# Patient Record
Sex: FEMALE | Race: WHITE | Employment: OTHER | URBAN - METROPOLITAN AREA
[De-identification: names, ages, dates, MRNs, and addresses within clinical notes are randomized per-mention and may not be internally consistent; named-entity substitution may affect disease eponyms.]

---

## 2017-08-12 ENCOUNTER — HOSPITAL ENCOUNTER (EMERGENCY)
Age: 62
Discharge: HOME | End: 2017-08-13
Attending: EMERGENCY MEDICINE
Payer: COMMERCIAL

## 2017-08-12 DIAGNOSIS — F41.8 ANXIETY ASSOCIATED WITH DEPRESSION: Primary | ICD-10-CM

## 2017-08-12 DIAGNOSIS — R68.89 WITHDRAWAL COMPLAINT: ICD-10-CM

## 2017-08-12 PROCEDURE — 96361 HYDRATE IV INFUSION ADD-ON: CPT

## 2017-08-12 PROCEDURE — 96372 THER/PROPH/DIAG INJ SC/IM: CPT

## 2017-08-12 PROCEDURE — 99283 EMERGENCY DEPT VISIT LOW MDM: CPT

## 2017-08-12 PROCEDURE — 96374 THER/PROPH/DIAG INJ IV PUSH: CPT

## 2017-08-13 VITALS
HEIGHT: 63 IN | HEART RATE: 87 BPM | SYSTOLIC BLOOD PRESSURE: 132 MMHG | BODY MASS INDEX: 26.64 KG/M2 | RESPIRATION RATE: 16 BRPM | OXYGEN SATURATION: 96 % | TEMPERATURE: 97.6 F | DIASTOLIC BLOOD PRESSURE: 84 MMHG | WEIGHT: 150.35 LBS

## 2017-08-13 LAB
ALBUMIN SERPL BCP-MCNC: 4.1 G/DL (ref 3.5–5)
ALBUMIN/GLOB SERPL: 1.3 {RATIO} (ref 1.1–2.2)
ALP SERPL-CCNC: 86 U/L (ref 45–117)
ALT SERPL-CCNC: 47 U/L (ref 12–78)
ANION GAP BLD CALC-SCNC: 12 MMOL/L (ref 5–15)
AST SERPL W P-5'-P-CCNC: 41 U/L (ref 15–37)
BASOPHILS # BLD AUTO: 0 K/UL (ref 0–0.1)
BASOPHILS # BLD: 0 % (ref 0–1)
BILIRUB SERPL-MCNC: 1.3 MG/DL (ref 0.2–1)
BUN SERPL-MCNC: 14 MG/DL (ref 6–20)
BUN/CREAT SERPL: 14 (ref 12–20)
CALCIUM SERPL-MCNC: 9.7 MG/DL (ref 8.5–10.1)
CHLORIDE SERPL-SCNC: 104 MMOL/L (ref 97–108)
CK SERPL-CCNC: 299 U/L (ref 26–192)
CO2 SERPL-SCNC: 21 MMOL/L (ref 21–32)
CREAT SERPL-MCNC: 0.97 MG/DL (ref 0.55–1.02)
EOSINOPHIL # BLD: 0.1 K/UL (ref 0–0.4)
EOSINOPHIL NFR BLD: 2 % (ref 0–7)
ERYTHROCYTE [DISTWIDTH] IN BLOOD BY AUTOMATED COUNT: 12.3 % (ref 11.5–14.5)
GLOBULIN SER CALC-MCNC: 3.2 G/DL (ref 2–4)
GLUCOSE SERPL-MCNC: 114 MG/DL (ref 65–100)
HCT VFR BLD AUTO: 34.6 % (ref 35–47)
HGB BLD-MCNC: 12.2 G/DL (ref 11.5–16)
LYMPHOCYTES # BLD AUTO: 42 % (ref 12–49)
LYMPHOCYTES # BLD: 2.3 K/UL (ref 0.8–3.5)
MAGNESIUM SERPL-MCNC: 2.1 MG/DL (ref 1.6–2.4)
MCH RBC QN AUTO: 32 PG (ref 26–34)
MCHC RBC AUTO-ENTMCNC: 35.3 G/DL (ref 30–36.5)
MCV RBC AUTO: 90.8 FL (ref 80–99)
MONOCYTES # BLD: 0.3 K/UL (ref 0–1)
MONOCYTES NFR BLD AUTO: 6 % (ref 5–13)
NEUTS SEG # BLD: 2.7 K/UL (ref 1.8–8)
NEUTS SEG NFR BLD AUTO: 50 % (ref 32–75)
PLATELET # BLD AUTO: 246 K/UL (ref 150–400)
POTASSIUM SERPL-SCNC: 3.3 MMOL/L (ref 3.5–5.1)
PROT SERPL-MCNC: 7.3 G/DL (ref 6.4–8.2)
RBC # BLD AUTO: 3.81 M/UL (ref 3.8–5.2)
SODIUM SERPL-SCNC: 137 MMOL/L (ref 136–145)
TROPONIN I SERPL-MCNC: <0.04 NG/ML
WBC # BLD AUTO: 5.5 K/UL (ref 3.6–11)

## 2017-08-13 PROCEDURE — 80053 COMPREHEN METABOLIC PANEL: CPT | Performed by: EMERGENCY MEDICINE

## 2017-08-13 PROCEDURE — 84484 ASSAY OF TROPONIN QUANT: CPT | Performed by: EMERGENCY MEDICINE

## 2017-08-13 PROCEDURE — 74011250636 HC RX REV CODE- 250/636: Performed by: EMERGENCY MEDICINE

## 2017-08-13 PROCEDURE — 74011250637 HC RX REV CODE- 250/637

## 2017-08-13 PROCEDURE — 82550 ASSAY OF CK (CPK): CPT | Performed by: EMERGENCY MEDICINE

## 2017-08-13 PROCEDURE — 36415 COLL VENOUS BLD VENIPUNCTURE: CPT | Performed by: EMERGENCY MEDICINE

## 2017-08-13 PROCEDURE — 74011250637 HC RX REV CODE- 250/637: Performed by: EMERGENCY MEDICINE

## 2017-08-13 PROCEDURE — 85025 COMPLETE CBC W/AUTO DIFF WBC: CPT | Performed by: EMERGENCY MEDICINE

## 2017-08-13 PROCEDURE — 83735 ASSAY OF MAGNESIUM: CPT | Performed by: EMERGENCY MEDICINE

## 2017-08-13 RX ORDER — CLONAZEPAM 0.5 MG/1
1 TABLET ORAL
Status: COMPLETED | OUTPATIENT
Start: 2017-08-13 | End: 2017-08-13

## 2017-08-13 RX ORDER — CLONAZEPAM 1 MG/1
1 TABLET ORAL
Qty: 20 TAB | Refills: 0 | Status: SHIPPED | OUTPATIENT
Start: 2017-08-13

## 2017-08-13 RX ORDER — LORAZEPAM 2 MG/ML
1 INJECTION INTRAMUSCULAR
Status: COMPLETED | OUTPATIENT
Start: 2017-08-13 | End: 2017-08-13

## 2017-08-13 RX ORDER — ZOLPIDEM TARTRATE 5 MG/1
5 TABLET ORAL
Status: COMPLETED | OUTPATIENT
Start: 2017-08-13 | End: 2017-08-13

## 2017-08-13 RX ORDER — PAROXETINE HYDROCHLORIDE 20 MG/1
20 TABLET, FILM COATED ORAL
Status: COMPLETED | OUTPATIENT
Start: 2017-08-13 | End: 2017-08-13

## 2017-08-13 RX ORDER — CLONAZEPAM 0.5 MG/1
TABLET ORAL
Status: DISCONTINUED
Start: 2017-08-13 | End: 2017-08-13 | Stop reason: HOSPADM

## 2017-08-13 RX ORDER — LORAZEPAM 2 MG/ML
2 INJECTION INTRAMUSCULAR
Status: COMPLETED | OUTPATIENT
Start: 2017-08-13 | End: 2017-08-13

## 2017-08-13 RX ORDER — ZOLPIDEM TARTRATE 5 MG/1
10 TABLET ORAL
Qty: 15 TAB | Refills: 0 | Status: SHIPPED | OUTPATIENT
Start: 2017-08-13

## 2017-08-13 RX ORDER — PAROXETINE HYDROCHLORIDE 40 MG/1
40 TABLET, FILM COATED ORAL DAILY
Qty: 5 TAB | Refills: 0 | Status: SHIPPED | OUTPATIENT
Start: 2017-08-13

## 2017-08-13 RX ORDER — ZOLPIDEM TARTRATE 5 MG/1
TABLET ORAL
Status: COMPLETED
Start: 2017-08-13 | End: 2017-08-13

## 2017-08-13 RX ADMIN — LORAZEPAM 2 MG: 2 INJECTION INTRAMUSCULAR; INTRAVENOUS at 01:09

## 2017-08-13 RX ADMIN — LORAZEPAM 1 MG: 2 INJECTION INTRAMUSCULAR; INTRAVENOUS at 00:12

## 2017-08-13 RX ADMIN — ZOLPIDEM TARTRATE 5 MG: 5 TABLET ORAL at 01:46

## 2017-08-13 RX ADMIN — CLONAZEPAM 1 MG: 0.5 TABLET ORAL at 01:46

## 2017-08-13 RX ADMIN — PAROXETINE 20 MG: 20 TABLET, FILM COATED ORAL at 02:44

## 2017-08-13 RX ADMIN — SODIUM CHLORIDE 1000 ML: 900 INJECTION, SOLUTION INTRAVENOUS at 01:46

## 2017-08-13 NOTE — DISCHARGE INSTRUCTIONS

## 2017-08-13 NOTE — ED NOTES
Pt sitting up in bed, talking, and appears calm. Pt states \"I feel like I can breathe again\" and states she thinks she can go home at this time. Waiting on medication to be sent from rx at this time. Informed pt of this.

## 2017-08-13 NOTE — ED NOTES
Pt complaining that ativan did not relieve symptoms. Pt states \"I'm having convulsions\". Upon assessment, pt is not having convulsions, but is very restless and unable to keep still.  Notified MD.

## 2017-08-13 NOTE — ED NOTES
Entered room to place IV and medicate pt. Pt calmer and resting quietly, but raising/tensing all limbs occasionally, stating \"I'm having convulsions\".

## 2017-08-13 NOTE — ED PROVIDER NOTES
HPI Comments: Channing Garland is a 60 y.o. female with PMhx significant for anxiety, depression, and plantar fasciitis, who presents ambulatory with sister and brother-in-law to the [facility] ED with cc of anxiety attack starting shortly PTA, which was triggered by events in a theater play she was viewing with her sister. Pt believes symptoms are also due to medication withdrawal, which she felt gradually coming on for a few days. Pt reports she has been in town since 8/10/17 visiting her sister from [place], but she forgot her anxiety and depression medications at home in [place]; medications include clonazepam 0.25mg, tranquilizer lorazepam 0.25mg, paxil, and ambien. She states she has not taken her medication x 5 days (since 8/8/17), and that she normally feels stable when regularly taking her medication. Pt denies CP, SOB, tingling, and numbness. Social Hx: - Tobacco, - EtOH    PCP: PROVIDER UNKNOWN    There are no other complaints, changes or physical findings at this time. The history is provided by the patient. No  was used. No past medical history on file. No past surgical history on file. No family history on file. Social History     Social History    Marital status:      Spouse name: N/A    Number of children: N/A    Years of education: N/A     Occupational History    Not on file. Social History Main Topics    Smoking status: Not on file    Smokeless tobacco: Not on file    Alcohol use Not on file    Drug use: Not on file    Sexual activity: Not on file     Other Topics Concern    Not on file     Social History Narrative         ALLERGIES: Claritin [loratadine]    Review of Systems   Constitutional: Negative for fever. HENT: Negative for congestion. Eyes: Negative. Respiratory: Negative for shortness of breath. Cardiovascular: Negative for chest pain. Gastrointestinal: Negative for abdominal pain.    Endocrine: Negative for heat intolerance. Genitourinary: Negative. Musculoskeletal: Negative for back pain. Skin: Negative for rash. Allergic/Immunologic: Negative for immunocompromised state. Neurological: Negative for numbness. Hematological: Does not bruise/bleed easily. Psychiatric/Behavioral: The patient is nervous/anxious.         + Medication withdrawal   All other systems reviewed and are negative. Patient Vitals for the past 12 hrs:   Temp Pulse Resp BP SpO2   08/13/17 0248 - - - - 96 %   08/13/17 0247 97.6 °F (36.4 °C) 87 16 132/84 96 %   08/13/17 0002 - (!) 112 24 (!) 180/107 95 %       Physical Exam   Constitutional: She is oriented to person, place, and time. She appears well-developed and well-nourished. She appears distressed (moderate). Tremulous   HENT:   Head: Normocephalic and atraumatic. Eyes: EOM are normal.   Neck: Normal range of motion. Neck supple. Cardiovascular: Regular rhythm and normal heart sounds. Tachycardia present. Pulmonary/Chest: Effort normal and breath sounds normal. No respiratory distress. Abdominal: Soft. Bowel sounds are normal. She exhibits no mass. There is no tenderness. Musculoskeletal: Normal range of motion. She exhibits no edema. Neurological: She is alert and oriented to person, place, and time. Coordination normal.   Skin: Skin is warm and dry. Psychiatric: She has a normal mood and affect. Nursing note and vitals reviewed. MDM  Number of Diagnoses or Management Options  Anxiety associated with depression:   Withdrawal complaint:   Diagnosis management comments:   DDx: Anxiety, withdrawal       Amount and/or Complexity of Data Reviewed  Clinical lab tests: ordered and reviewed  Review and summarize past medical records: yes  Independent visualization of images, tracings, or specimens: yes    Patient Progress  Patient progress: stable    ED Course       Procedures    PROGRESS NOTE:  12:47 AM  Pt does not feel better with the Ativan.     PROGRESS NOTE:  2:40 AM  Pt is feeling better, just waiting for a couple of labs to come back. LABORATORY TESTS:  Recent Results (from the past 12 hour(s))   CBC WITH AUTOMATED DIFF    Collection Time: 08/13/17  1:12 AM   Result Value Ref Range    WBC 5.5 3.6 - 11.0 K/uL    RBC 3.81 3.80 - 5.20 M/uL    HGB 12.2 11.5 - 16.0 g/dL    HCT 34.6 (L) 35.0 - 47.0 %    MCV 90.8 80.0 - 99.0 FL    MCH 32.0 26.0 - 34.0 PG    MCHC 35.3 30.0 - 36.5 g/dL    RDW 12.3 11.5 - 14.5 %    PLATELET 181 553 - 111 K/uL    NEUTROPHILS 50 32 - 75 %    LYMPHOCYTES 42 12 - 49 %    MONOCYTES 6 5 - 13 %    EOSINOPHILS 2 0 - 7 %    BASOPHILS 0 0 - 1 %    ABS. NEUTROPHILS 2.7 1.8 - 8.0 K/UL    ABS. LYMPHOCYTES 2.3 0.8 - 3.5 K/UL    ABS. MONOCYTES 0.3 0.0 - 1.0 K/UL    ABS. EOSINOPHILS 0.1 0.0 - 0.4 K/UL    ABS. BASOPHILS 0.0 0.0 - 0.1 K/UL   METABOLIC PANEL, COMPREHENSIVE    Collection Time: 08/13/17  1:12 AM   Result Value Ref Range    Sodium 137 136 - 145 mmol/L    Potassium 3.3 (L) 3.5 - 5.1 mmol/L    Chloride 104 97 - 108 mmol/L    CO2 21 21 - 32 mmol/L    Anion gap 12 5 - 15 mmol/L    Glucose 114 (H) 65 - 100 mg/dL    BUN 14 6 - 20 MG/DL    Creatinine 0.97 0.55 - 1.02 MG/DL    BUN/Creatinine ratio 14 12 - 20      GFR est AA >60 >60 ml/min/1.73m2    GFR est non-AA 58 (L) >60 ml/min/1.73m2    Calcium 9.7 8.5 - 10.1 MG/DL    Bilirubin, total 1.3 (H) 0.2 - 1.0 MG/DL    ALT (SGPT) 47 12 - 78 U/L    AST (SGOT) 41 (H) 15 - 37 U/L    Alk.  phosphatase 86 45 - 117 U/L    Protein, total 7.3 6.4 - 8.2 g/dL    Albumin 4.1 3.5 - 5.0 g/dL    Globulin 3.2 2.0 - 4.0 g/dL    A-G Ratio 1.3 1.1 - 2.2     MAGNESIUM    Collection Time: 08/13/17  1:12 AM   Result Value Ref Range    Magnesium 2.1 1.6 - 2.4 mg/dL   CK    Collection Time: 08/13/17  1:12 AM   Result Value Ref Range     (H) 26 - 192 U/L   TROPONIN I    Collection Time: 08/13/17  1:12 AM   Result Value Ref Range    Troponin-I, Qt. <0.04 <0.05 ng/mL       MEDICATIONS GIVEN:  Medications   LORazepam (ATIVAN) injection 1 mg (1 mg IntraMUSCular Given 8/13/17 0012)   LORazepam (ATIVAN) injection 2 mg (2 mg IntraVENous Given 8/13/17 0109)   sodium chloride 0.9 % bolus infusion 1,000 mL (0 mL IntraVENous IV Completed 8/13/17 0246)   clonazePAM (KlonoPIN) tablet 1 mg (1 mg Oral Given 8/13/17 0146)   zolpidem (AMBIEN) tablet 5 mg (5 mg Oral Given 8/13/17 0146)   PARoxetine (PAXIL) tablet 20 mg (20 mg Oral Given 8/13/17 0244)       IMPRESSION:  1. Anxiety associated with depression    2. Withdrawal complaint        PLAN:  1. Discharge Medication List as of 8/13/2017  2:53 AM      START taking these medications    Details   clonazePAM (KLONOPIN) 1 mg tablet Take 1 Tab by mouth every eight (8) hours as needed. Max Daily Amount: 3 mg., Print, Disp-20 Tab, R-0      zolpidem (AMBIEN) 5 mg tablet Take 2 Tabs by mouth nightly as needed for Sleep. Max Daily Amount: 10 mg., Print, Disp-15 Tab, R-0      PARoxetine (PAXIL) 40 mg tablet Take 1 Tab by mouth daily. , Print, Disp-5 Tab, R-0           2. Follow-up Information     Follow up With Details Comments Contact Info    see your Dr In 2 days As needed     [facility]  If symptoms worsen [address] [place] [address]  [phone]        Return to ED if worse     DISCHARGE NOTE  3:06 AM  The patient has been re-evaluated and is ready for discharge. Reviewed available results with patient. Counseled patient on diagnosis and care plan. Patient has expressed understanding, and all questions have been answered. Patient agrees with plan and agrees to follow up as recommended, or return to the ED if their symptoms worsen. Discharge instructions have been provided and explained to the patient, along with reasons to return to the ED.     Attestation Note:  This note is prepared by Todd Grande, acting as Scribe for Magda Egan MD.    Magda Egan MD: The scribe's documentation has been prepared under my direction and personally reviewed by me in its entirety. I confirm that the note above accurately reflects all work, treatment, procedures, and medical decision making performed by me.

## 2017-08-13 NOTE — ED NOTES
Dr _____Smith___________________ in to talk with patient and explain plan of care with  understanding and  written & verbal instructions.

## 2017-08-13 NOTE — ED NOTES
Assumed care of pt from triage. Pt complaining of anxiety attack. Attack started around 2300 tonight while watching \"a depressing play\". Pt appears very anxious and restless. Pt is diaphoretic and very tense. Pt has history of anxiety. Pt in no acute distress at this time. Call bell within reach. Will continue to monitor.